# Patient Record
Sex: FEMALE | Employment: FULL TIME | URBAN - METROPOLITAN AREA
[De-identification: names, ages, dates, MRNs, and addresses within clinical notes are randomized per-mention and may not be internally consistent; named-entity substitution may affect disease eponyms.]

---

## 2019-12-13 ENCOUNTER — TRANSFERRED RECORDS (OUTPATIENT)
Dept: HEALTH INFORMATION MANAGEMENT | Facility: CLINIC | Age: 54
End: 2019-12-13

## 2019-12-18 ENCOUNTER — MEDICAL CORRESPONDENCE (OUTPATIENT)
Dept: HEALTH INFORMATION MANAGEMENT | Facility: CLINIC | Age: 54
End: 2019-12-18

## 2020-02-07 ENCOUNTER — OFFICE VISIT (OUTPATIENT)
Dept: SURGERY | Facility: CLINIC | Age: 55
End: 2020-02-07
Payer: COMMERCIAL

## 2020-02-07 VITALS
BODY MASS INDEX: 39.65 KG/M2 | DIASTOLIC BLOOD PRESSURE: 87 MMHG | HEIGHT: 61 IN | WEIGHT: 210 LBS | SYSTOLIC BLOOD PRESSURE: 130 MMHG | HEART RATE: 98 BPM | OXYGEN SATURATION: 98 %

## 2020-02-07 DIAGNOSIS — E05.20 TOXIC MULTINODULAR GOITER: Primary | ICD-10-CM

## 2020-02-07 PROCEDURE — 99203 OFFICE O/P NEW LOW 30 MIN: CPT | Performed by: SURGERY

## 2020-02-07 RX ORDER — METHIMAZOLE 5 MG/1
TABLET ORAL
COMMUNITY
Start: 2019-12-13

## 2020-02-07 RX ORDER — LORATADINE AND PSEUDOEPHEDRINE SULFATE 5; 120 MG/1; MG/1
TABLET, EXTENDED RELEASE ORAL
COMMUNITY
Start: 2019-11-14

## 2020-02-07 ASSESSMENT — MIFFLIN-ST. JEOR: SCORE: 1499.93

## 2020-02-07 NOTE — LETTER
"    2/7/2020         RE: Mervat Pemberton  [address]  [place] [address]        Dear Colleague,    Thank you for referring your patient, Mervat Pemberton, to the [facility]. Please see a copy of my visit note below.    Mervat Pemberton's goals for this visit include:   Chief Complaint   Patient presents with     New Patient     Thyroid nodule and Hyperthyroidism       She requests these members of her care team be copied on today's visit information:     PCP: Natalia Morales    Referring Provider:  No referring provider defined for this encounter.    /87 (BP Location: Left arm, Patient Position: Sitting)   Pulse 98   Ht 1.549 m (5' 1\")   Wt 95.3 kg (210 lb)   SpO2 98%   BMI 39.68 kg/m      Do you need any medication refills at today's visit? No    Carmelita Hartman LPN      SURGERY CLINIC CONSULTATION  I spent >30 minutes in the care, exam and consultation of this patient for surgical options for toxic multinodular goiter   REASON FOR CONSULTATION:  Mervat Pemberton was referred by Dr. Natalia Morales for evaluation and discussion of treatment options for toxic MNG     HISTORY OF PRESENT ILLNESS:  53yo female with history of hyperthyroidism. On methimazole 2.5mg daily with recent TSH 0.9 and T4 0.92, T3 of 107. She is feeling well on this dose.   Nuc med scan 7/12 was 26% uptake. Most recent US 4 nodules greater than 1.5cm and the largest left interior 2.8 increased in size. FNA multiple nodules benign.     Regarding the thyroid she denies any problems with voice quality, inspiration or swallowing. Sx of hyperthyroidism have resolved on current dose of methimazole. No family h/o thyroid cancer, no previious h/o PTC. No HN XRT      REVIEW OF SYSTEMS:  ROS EXAM: 10 pt ROS pertinent for that noted in HPI  Patient Active Problem List   Diagnosis     Allergic rhinitis     Hyperthyroidism       No past surgical history on file.    Allergies   Allergen Reactions     Erythromycin Nausea     " "Penicillins Other (See Comments) and Hives     penicillin g potassium in d5w     Doxycycline Rash     Meds reviewed in EMR          No family history on file.       PHYSICAL EXAM:  /87 (BP Location: Left arm, Patient Position: Sitting)   Pulse 98   Ht 1.549 m (5' 1\")   Wt 95.3 kg (210 lb)   SpO2 98%   BMI 39.68 kg/m      NECK: Thyroid gland is enlarged with multiple soft nodules. Thyroid moves nicely with swallowing. Trachea is midline. No cervical lymphadenopathy    ASSESSMENT:   Toxic MNG    PLAN:   Options include current medical management with annual US versus total thyroidectomy. I discussed total thyroidectomy with the patient including risks and postoperative LT4. Patient will contact me should she decide to proceed with surgery.    Natalia Law MD          Again, thank you for allowing me to participate in the care of your patient.        Sincerely,        Natalia Law MD    "

## 2020-02-07 NOTE — PROGRESS NOTES
"Mervat Pemberton's goals for this visit include:   Chief Complaint   Patient presents with     New Patient     Thyroid nodule and Hyperthyroidism       She requests these members of her care team be copied on today's visit information:     PCP: Natalia Morales    Referring Provider:  No referring provider defined for this encounter.    /87 (BP Location: Left arm, Patient Position: Sitting)   Pulse 98   Ht 1.549 m (5' 1\")   Wt 95.3 kg (210 lb)   SpO2 98%   BMI 39.68 kg/m      Do you need any medication refills at today's visit? No    Carmelita Hartman LPN    "

## 2020-02-07 NOTE — PATIENT INSTRUCTIONS
Surgery Instructions    Always follow your surgeon s instructions. If you don t, your surgery could be cancelled. Please use the following checklist.  Your surgery is on: The surgery scheduler will contact you within 1 week of your consult with the surgeon. If you do not hear from them, please call the clinic or RN Care Coordinator for your provider.    Time: Prearrival times can vary depending on location/type of surgery.  [place] - 2 hour pre-arrival  [facility] - 2 hour pre-arrival  [place] - 1 hour pre-arrival    Note:  These times may change. A nurse will call you before surgery to confirm. If you have not received a call or if you have more questions, please call us on the working day before your surgery:  ? [place]: [phone] or [phone] (9am to 5:30pm)  ? [facility]: [phone] (8am to 6pm)  ? [place]: [phone] (9am to 5pm)  ? [facility] [phone] (7am to 4pm)  Prior to surgery  ? Have a pre-op physical exam with your Primary Doctor within 30 days of surgery  - Ask your doctor to send all of your results to the surgery center/hospital before surgery. Your doctor also may ask you to bring the results with you on the day of surgery.  - Tell your doctor if:  - You are allergic to latex or rubber (latex and rubber gloves are often used in medical care).  - You are taking any medicines (including aspirin), vitamins, or herbal products. You may need to stop taking some medicines before surgery.  - You have any medical problems (allergies, diabetes, or heart disease, for example).  - You have a pacemaker or an AICD (automatic implanted cardiac defibrillator). If you do, please bring the ID card with you on the day of surgery.  - People who smoke have a higher risk of infection after surgery. Ask your doctor how you can quit smoking.  - If you Primary Doctor is not within the [email or web address] system, you will need to have your pre-op physical faxed to us to be scanned into your  chart.  - [place]: [phone] or [phone]  - [facility] ([place]): [phone]  - [facility]): [phone]  ? Call your insurance company. Ask if you need pre-approval for your surgery. If you do not have insurance, please let us know. If you wish to speak to the , please alert the clinic staff so this can be arranged.  ? Arrange for someone to drive you home after surgery.  will need to be a responsible adult (18 years or older) that will provide transportation to and from surgery and stay in the waiting room during your surgery. You may not drive yourself or take public transportation to and from surgery.  ? Arrange for someone to stay with you for 24 hours after you go home. This person must be a responsible adult (18 years or older).  ? Call your surgeon or their nurse if there is any change in your health (cold, flu, infections, hospitalizations).  ? Do not smoke, drink alcohol, or take over-the-counter medicine for 24 hours before and after surgery.  ? If you take prescribed drugs, you may need to stop them until after the surgery.  Discuss what medications to take or not take prior to surgery with your Primary Doctor at your pre-op physical. Avoid over-the-counter blood-thinning medications such as Aspirin, Ibuprofen, vitamin E, or fish oil 7 days prior to surgery (unless otherwise directed by your Primary Doctor). Tylenol is a good alternative for mild pain relief prior to surgery.  ? Eating and drinking guidelines prior to surgery:  - Stop all solid food consumption 8 hours prior to surgery  - You may drink clear liquids up to 2 hours prior to surgery (water, fruit juices without pulp, jello, tea/coffee without creamer, sports drinks, clear-fat free broth (bouillon or consomme), popsicles (without milk, bits of fruit, or seeds/nuts)  ? Follow instructions given for showering or bathing before surgery.    - Use 8 ounces of antiseptic surgical soap,  like:  - Hibiclens, Scrub Care, or Exidine  - You can find it at your local pharmacy, clinic, or retail store. If you have trouble, ask your pharmacist to help you find the right substitute.  - Please wash with one of the above soaps twice before coming to the hospital for your surgery. This will decrease bacteria (germs) on your skin. It will also help reduce your chance of infection after surgery.  - Items you will need for showering:  - 4 newly washed washcloths  - 2 newly washed towels  - 8 ounces of one of the above soaps  - Following these instructions:  - The evening before surgery: Shower or bathe as you normally would, using your regular soap and a clean washcloth. Give special attention to places where your incision (surgical cut) or catheters will be. This includes your groin area. Rinse well. You may wash your hair with your regular shampoo. Next, wash your body with 4 ounces of the antiseptic soap. Use a clean, damp washcloth and gently clean your body (from the chin down). If your surgery involves your head, use the special soap on your head and scalp. Rinse well and dry off using a newly washed towel.  - The morning of surgery: Repeat the same process as the evening shower.  - Other suggestions:    Do not shave within 12 inches of your incision (surgical cut) area for at least 3 days before surgery. Shaving can make small cuts in the skin. This puts you at higher risk of infection.    Wear freshly washed pajamas or clothing after your evening shower.    Wear freshly washed clothes the day of surgery.    Wash and change your bed sheets the day before surgery to have clean bed sheets after your shower and when you get home from surgery.    If you have trouble washing all areas, make sure someone helps you.    Don't use any deodorant, lotion or powder after your shower.    Women who are menstruating should wear a fresh sanitary pad to the hospital.  ? Do not wear or add deodorant, cologne, lotion,  makeup, nail polish or jewelry to surgery. If you wear fake nails, please remove at least one nail before coming to surgery (an oxygen monitor needs to be placed on your finger during surgery).  ? Bring these items to the surgery center/hospital:  - Insurance card  - Money for parking and co-pays, if needed  - A list of all the medicines you take. Include vitamins, minerals, herbs, and over-the-counter drugs.  Note any drug allergies.  - A copy of your advance health care directive, if you have one. This tells us what treatment you would want--and who would make health care decisions--if you could no longer speak for yourself. You may request this form in advance or download it from [email or web address].  - A case for glasses, contact lenses, hearing aids, or dentures.  - Your inhaler or CPAP machine, if you use these at home.  ? Leave extra cash, jewelry, and other valuables at home.  When you arrive  When you get to the surgery center/hospital, you will:  ? Check in. If you are under age 18, you must be with a parent or legal guardian.  ? Sign consent forms, if you haven t already. These forms state that you know the risks and benefits of surgery. When you sign the forms, you give us permission to do the surgery. Do not sign them unless you understand what will happen during and after your surgery. If you have any questions about your surgery, ask to speak with your doctor before you sign the forms. If you don t understand the answers, ask again.  ? Receive a copy of the Patient s Bill of Rights. If you do not receive a copy, please ask for one.  ? Change into hospital clothes. Your belongings will be placed in a bag. We will return them to you after surgery.  ? Meet with the anesthesia provider. He or she will tell you what kind of anesthesia (medicine) will be used to keep you comfortable during surgery.  Remember: it s okay to remind doctors and nurses to wash their hands before touching you.  In most  cases, your surgeon will use a marker to write his or her initials on the surgery site. This ensures that the exact site is operated on.  For safety reasons, we will ask you the same questions many times. For example, we may ask your name and birth date over and over again.  Friends and family can stay with you until it s time for surgery. While you re in surgery, they will be in the waiting area. Please note that cell phones are not allowed in some patient care areas.  If you have questions about what will happen in the operating room, talk to your care team.  After surgery  We will move you to a recovery room, where we will watch you closely. If you have any pain or discomfort, tell your nurse. He or she will try to make you comfortable.  If you are staying overnight, we will move you to your hospital room after you are awake.  If you are going home, we will move you to another room. Friends and family may be able to join you. The length of time you spend in recovery depend on the type of medicine you received, your medical condition, the type of surgery you had, or your response to the anesthesia given during your procedure.  When you are discharged from the recovery room, the nurses will review instructions with you and your caregiver.  ? Please wash your hands every time you touch the wound or change bandages or dressings.  ? Do not submerge the wound in water.  You may not use a bathtub or hot tub until the wound is closed. The wait time frame is generally 2-3 weeks, but any open area can be a source of incoming bacteria, so it is better to be on the safe side and avoid water submersion until your wound is fully healed.  ? You may take a shower 24 hours after surgery. Double check with your surgeon if it is OK for water to run over the wound, whether it has been sutured, stapled, glued, or is open. You may gently wash the wound using the antiseptic soap provided for your pre-surgery showering (do not use a  washcloth). Any mild soap will work as well.  ? Many surgical wounds will have small white strips of tape on them called steri-strips.  Do not remove these. The edges will curl and fall off within 7-10 days with normal showering.  ? If you are going home with sutures (stitches) or staples, you must return to the clinic to have them taken out, usually within 1-2 weeks. Some stitches are dissolvable and do not require removal. Make sure to clarify with your surgeon or surgery nurse reviewing discharge paperwork what kind of sutures you have.  ? Signs and symptoms of infection include:  - Fever, temperature over 101.5   F  - Redness  - Swelling  - Increased pain  - Green or yellow drainage which may or may not have a foul odor  Dealing with pain  A nurse will check your comfort level often during your stay. He or she will work with you to manage your pain.  Remember:  ? All pain is real. There are many ways to control pain. We can help you decide what works best for you.  ? Ask for pain medicine when you need it. Don t try to  tough it out --this can make you feel worse. Always take your medicine as ordered.  ? Medicine doesn t work the same for everyone. If your medicine isn t working, tell your nurse. There may be other medicines or treatments we can try.  Going home  We will let you know when you re ready to leave the surgery center or hospital. Before you leave, we will tell you how to care for yourself at home and prevent infections. If you do not understand something, please say so. We will answer any questions you have. We will then help you get ready to leave.  Remember, you must have a responsible adult (18 years or older) to stay with you 24 hours after you leave the hospital.  Take it easy when you get home. You will need some time to recover--you may be more tired than you realize at first. Rest and relax for at least the first 24 hours at home. You ll feel better and heal faster if you take good care of  yourself.  Follow the discharge instructions that are given to you when you leave the surgery center or hospital  Please call the clinic if you experience any problems during regular clinic hours (Monday-Friday 8:00am-5:00pm).  If you experience problems during non-clinic hours, please call the [facility] on-call line at [phone] and ask the  to page the on-call Provider for your specialty. The on-call Provider will call you back and can triage your symptoms and further advise. If you are having an emergency, always call 911 or seek immediate evaluation at the Emergency Room.  Locations  [facility] ([facility])  [address] [place] [id number]  [phone]   [email or web address]

## 2020-03-07 NOTE — PROGRESS NOTES
"SURGERY CLINIC CONSULTATION  I spent >30 minutes in the care, exam and consultation of this patient for surgical options for toxic multinodular goiter   REASON FOR CONSULTATION:  Meravt Pemberton was referred by Dr. Natalia Morales for evaluation and discussion of treatment options for toxic MNG     HISTORY OF PRESENT ILLNESS:  55yo female with history of hyperthyroidism. On methimazole 2.5mg daily with recent TSH 0.9 and T4 0.92, T3 of 107. She is feeling well on this dose.   Nuc med scan 7/12 was 26% uptake. Most recent US 4 nodules greater than 1.5cm and the largest left interior 2.8 increased in size. FNA multiple nodules benign.     Regarding the thyroid she denies any problems with voice quality, inspiration or swallowing. Sx of hyperthyroidism have resolved on current dose of methimazole. No family h/o thyroid cancer, no previious h/o PTC. No HN XRT      REVIEW OF SYSTEMS:  ROS EXAM: 10 pt ROS pertinent for that noted in HPI  Patient Active Problem List   Diagnosis     Allergic rhinitis     Hyperthyroidism       No past surgical history on file.    Allergies   Allergen Reactions     Erythromycin Nausea     Penicillins Other (See Comments) and Hives     penicillin g potassium in d5w     Doxycycline Rash     Meds reviewed in EMR          No family history on file.       PHYSICAL EXAM:  /87 (BP Location: Left arm, Patient Position: Sitting)   Pulse 98   Ht 1.549 m (5' 1\")   Wt 95.3 kg (210 lb)   SpO2 98%   BMI 39.68 kg/m      NECK: Thyroid gland is enlarged with multiple soft nodules. Thyroid moves nicely with swallowing. Trachea is midline. No cervical lymphadenopathy    ASSESSMENT:   Toxic MNG    PLAN:   Options include current medical management with annual US versus total thyroidectomy. I discussed total thyroidectomy with the patient including risks and postoperative LT4. Patient will contact me should she decide to proceed with surgery.    Natalia Law MD        "